# Patient Record
Sex: MALE | Race: WHITE | URBAN - METROPOLITAN AREA
[De-identification: names, ages, dates, MRNs, and addresses within clinical notes are randomized per-mention and may not be internally consistent; named-entity substitution may affect disease eponyms.]

---

## 2022-01-01 ENCOUNTER — TELEPHONE ENCOUNTER (OUTPATIENT)
Dept: URBAN - METROPOLITAN AREA CLINIC 54 | Facility: CLINIC | Age: 32
End: 2022-01-01

## 2022-01-01 ENCOUNTER — OUT OF OFFICE VISIT (OUTPATIENT)
Dept: URBAN - METROPOLITAN AREA MEDICAL CENTER 23 | Facility: MEDICAL CENTER | Age: 32
End: 2022-01-01
Payer: SELF-PAY

## 2022-01-01 ENCOUNTER — OFFICE VISIT (OUTPATIENT)
Dept: URBAN - METROPOLITAN AREA CLINIC 54 | Facility: CLINIC | Age: 32
End: 2022-01-01

## 2022-01-01 ENCOUNTER — TELEPHONE ENCOUNTER (OUTPATIENT)
Dept: URBAN - METROPOLITAN AREA CLINIC 92 | Facility: CLINIC | Age: 32
End: 2022-01-01

## 2022-01-01 ENCOUNTER — OFFICE VISIT (OUTPATIENT)
Dept: URBAN - METROPOLITAN AREA CLINIC 54 | Facility: CLINIC | Age: 32
End: 2022-01-01
Payer: SELF-PAY

## 2022-01-01 ENCOUNTER — WEB ENCOUNTER (OUTPATIENT)
Dept: URBAN - METROPOLITAN AREA CLINIC 54 | Facility: CLINIC | Age: 32
End: 2022-01-01

## 2022-01-01 VITALS
HEIGHT: 65 IN | WEIGHT: 161.4 LBS | DIASTOLIC BLOOD PRESSURE: 57 MMHG | HEART RATE: 105 BPM | TEMPERATURE: 98.1 F | SYSTOLIC BLOOD PRESSURE: 97 MMHG | BODY MASS INDEX: 26.89 KG/M2

## 2022-01-01 DIAGNOSIS — K70.31 ALCOHOLIC CIRRHOSIS OF LIVER WITH ASCITES: ICD-10-CM

## 2022-01-01 DIAGNOSIS — K76.9 ACUTE LIVER DISEASE: ICD-10-CM

## 2022-01-01 DIAGNOSIS — R11.2 ACUTE NAUSEA WITH NONBILIOUS VOMITING: ICD-10-CM

## 2022-01-01 DIAGNOSIS — K70.30 ALC (ALCOHOLIC LIVER CIRRHOSIS): ICD-10-CM

## 2022-01-01 DIAGNOSIS — R17 CHOLESTATIC JAUNDICE: ICD-10-CM

## 2022-01-01 DIAGNOSIS — L03.116 CELLULITIS OF LEFT LOWER EXTREMITY: ICD-10-CM

## 2022-01-01 DIAGNOSIS — A04.72 C. DIFFICILE: ICD-10-CM

## 2022-01-01 DIAGNOSIS — R10.11 ABDOMINAL BURNING SENSATION IN RIGHT UPPER QUADRANT: ICD-10-CM

## 2022-01-01 DIAGNOSIS — K70.10 ACUTE ALCOHOLIC HEPATITIS: ICD-10-CM

## 2022-01-01 DIAGNOSIS — R18.8 ABDOMINAL ASCITES: ICD-10-CM

## 2022-01-01 DIAGNOSIS — R41.82 ALTERED MENTAL STATUS, UNSPECIFIED ALTERED MENTAL STATUS TYPE: ICD-10-CM

## 2022-01-01 DIAGNOSIS — D72.829 ELEVATED WBC COUNT: ICD-10-CM

## 2022-01-01 DIAGNOSIS — L29.9 PRURITUS: ICD-10-CM

## 2022-01-01 DIAGNOSIS — K70.11 ALCOHOLIC HEPATITIS WITH ASCITES: ICD-10-CM

## 2022-01-01 DIAGNOSIS — K31.89 ACQUIRED DEFORMITY OF DUODENUM: ICD-10-CM

## 2022-01-01 DIAGNOSIS — K72.10 CHRONIC HEPATIC FAILURE WITHOUT COMA: ICD-10-CM

## 2022-01-01 DIAGNOSIS — N17.9 ACUTE INJURY OF KIDNEY: ICD-10-CM

## 2022-01-01 DIAGNOSIS — K62.5 ANAL BLEEDING: ICD-10-CM

## 2022-01-01 DIAGNOSIS — K62.5 RECTAL BLEEDING: ICD-10-CM

## 2022-01-01 PROCEDURE — 99232 SBSQ HOSP IP/OBS MODERATE 35: CPT | Performed by: STUDENT IN AN ORGANIZED HEALTH CARE EDUCATION/TRAINING PROGRAM

## 2022-01-01 PROCEDURE — 99255 IP/OBS CONSLTJ NEW/EST HI 80: CPT | Performed by: INTERNAL MEDICINE

## 2022-01-01 PROCEDURE — 43239 EGD BIOPSY SINGLE/MULTIPLE: CPT | Performed by: INTERNAL MEDICINE

## 2022-01-01 PROCEDURE — 99214 OFFICE O/P EST MOD 30 MIN: CPT | Performed by: STUDENT IN AN ORGANIZED HEALTH CARE EDUCATION/TRAINING PROGRAM

## 2022-01-01 PROCEDURE — 99233 SBSQ HOSP IP/OBS HIGH 50: CPT | Performed by: INTERNAL MEDICINE

## 2022-01-01 PROCEDURE — 99254 IP/OBS CNSLTJ NEW/EST MOD 60: CPT | Performed by: STUDENT IN AN ORGANIZED HEALTH CARE EDUCATION/TRAINING PROGRAM

## 2022-01-01 PROCEDURE — 99232 SBSQ HOSP IP/OBS MODERATE 35: CPT | Performed by: INTERNAL MEDICINE

## 2022-01-01 RX ORDER — HYDROXYZINE HYDROCHLORIDE 25 MG/1
1 TABLET AT BEDTIME AS NEEDED TABLET, FILM COATED ORAL ONCE A DAY
COMMUNITY

## 2022-01-01 RX ORDER — FUROSEMIDE 40 MG/1
1 TABLET TABLET ORAL ONCE A DAY
COMMUNITY

## 2022-01-01 RX ORDER — SODIUM BICARBONATE TAB 650 MG 650 MG
AS DIRECTED TAB ORAL
COMMUNITY

## 2022-01-01 RX ORDER — FAMOTIDINE 40 MG/1
1 TABLET AT BEDTIME TABLET, FILM COATED ORAL ONCE A DAY
COMMUNITY

## 2022-01-01 RX ORDER — CHOLESTYRAMINE 4 G/9G
1 PACKET MIXED WITH WATER OR NON-CARBONATED DRINK POWDER, FOR SUSPENSION ORAL ONCE A DAY
COMMUNITY

## 2022-01-01 RX ORDER — LORAZEPAM 0.5 MG/1
1 TABLET AT BEDTIME AS NEEDED TABLET ORAL ONCE A DAY
COMMUNITY

## 2022-01-01 RX ORDER — LIDOCAINE 140 MG/1
1 PATCH REMOVE AFTER 12 HOURS PATCH CUTANEOUS ONCE A DAY
COMMUNITY

## 2022-01-01 RX ORDER — SODIUM CHLORIDE TAB 1 GM 1 G
AS DIRECTED TAB MISCELLANEOUS
COMMUNITY

## 2022-09-15 PROBLEM — 19943007: Status: ACTIVE | Noted: 2022-01-01

## 2022-09-22 PROBLEM — 389026000: Status: ACTIVE | Noted: 2022-01-01

## 2022-10-04 NOTE — HPI-TODAY'S VISIT:
Mr. Newman voices a 32-year-old man with a history of decompensated alcohol-related liver cirrhosis who returns for hospital follow-up.  Patient initially seen in early September for acute alcoholic hepatitis.  He was ultimately discharged on prednisone with improving LFTs.

## 2022-10-11 PROBLEM — 419284004: Status: ACTIVE | Noted: 2022-01-01

## 2022-10-11 PROBLEM — 420054005: Status: ACTIVE | Noted: 2022-01-01

## 2022-10-11 NOTE — HPI-TODAY'S VISIT:
Mr. Luís Villareal is a 32-year-old man with a history of decompensated alcohol-related liver cirrhosis who returns for hospital follow-up.  Patient initially seen in early September for acute alcoholic hepatitis.  He was ultimately discharged on prednisone with improving LFTs. Secondary liver work-up including evaluation for autoimmune hepatitis and viral hepatitis have been unremarkable. Patient's last drink was 1 month and 10 days ago, per patient. He has been managed with furosemide 40 mg and spironolactone 100 mg and intermittent paracenteses to address ascites. On September 11, patient had EGD for variceal screening which was significant for mild diffuse portal hypertensive gastropathy.  There were no varices noted. CT abdomen pelvis with IV contrast in September was without hepatic lesions. Most recently, patient was hospitalized with severe hyponatremia.  At that time nephrology was consulted and it was decided patient should be taken off of spironolactone.  Nephrology has assumed care of his fluid management for the time being.  His last calculated meld on 10/6 was 33, corresponding to a 55 to 60% mortality in the next 90 days.  He is aware of the guarded nature of his disease.  Today he complains of left foot pain.  It is painful for him to walk.  Patient does not endorse any fevers or chills.  He states the top of his affected foot has an area that is very red.  He also endorses some slowed speech, and change in sleep cycles (sleepy during the day, not just at night).  He does state that he has been taking Ativan.

## 2022-10-11 NOTE — EXAM-PHYSICAL EXAM
General: Chronically ill-appearing. No acute distress. HEENT: Scleral icterus. Cardiovascular: Normal heart rate, no lower extremity edema Respiratory: Breathing comfortably without conversational dyspnea Abdomen:  soft, + shifting dullness.  Non-tender.. Rectal: Focal hyperpigmented scab/skin on right inner buttocks.  No induration.  Trace blood at anal canal.  Rectal tenderness at insertion of finger. Skin: Extreme jaundiced.  Hyperpigmented erythematous skin on the medial dorsal surface of patient's left foot.  This area is warm to touch and tender to light palpation. Musculoskeletal: without muscle wasting. ambulates without difficulty. Can stand from sitting position without assistance. Neuro: No focal deficits or asterixis. Alert and oriented.  Psych: Appropriate mood and affect.

## 2022-12-22 ENCOUNTER — DASHBOARD ENCOUNTERS (OUTPATIENT)
Age: 32
End: 2022-12-22

## 2023-01-03 ENCOUNTER — OFFICE VISIT (OUTPATIENT)
Dept: URBAN - METROPOLITAN AREA CLINIC 54 | Facility: CLINIC | Age: 33
End: 2023-01-03

## 2023-01-03 RX ORDER — LIDOCAINE 140 MG/1
1 PATCH REMOVE AFTER 12 HOURS PATCH CUTANEOUS ONCE A DAY
COMMUNITY

## 2023-01-03 RX ORDER — CHOLESTYRAMINE 4 G/9G
1 PACKET MIXED WITH WATER OR NON-CARBONATED DRINK POWDER, FOR SUSPENSION ORAL ONCE A DAY
COMMUNITY

## 2023-01-03 RX ORDER — LORAZEPAM 0.5 MG/1
1 TABLET AT BEDTIME AS NEEDED TABLET ORAL ONCE A DAY
COMMUNITY

## 2023-01-03 RX ORDER — FAMOTIDINE 40 MG/1
1 TABLET AT BEDTIME TABLET, FILM COATED ORAL ONCE A DAY
COMMUNITY

## 2023-01-03 RX ORDER — SODIUM BICARBONATE TAB 650 MG 650 MG
AS DIRECTED TAB ORAL
COMMUNITY

## 2023-01-03 RX ORDER — FUROSEMIDE 40 MG/1
1 TABLET TABLET ORAL ONCE A DAY
COMMUNITY

## 2023-01-03 RX ORDER — SODIUM CHLORIDE TAB 1 GM 1 G
AS DIRECTED TAB MISCELLANEOUS
COMMUNITY

## 2023-01-03 RX ORDER — HYDROXYZINE HYDROCHLORIDE 25 MG/1
1 TABLET AT BEDTIME AS NEEDED TABLET, FILM COATED ORAL ONCE A DAY
COMMUNITY